# Patient Record
(demographics unavailable — no encounter records)

---

## 2025-04-02 NOTE — CARDIOLOGY SUMMARY
[de-identified] : 4/2/25 atrial fibrillation with a ventricular rate of 69, narrow QRS [de-identified] : TTE 10/19/21  LVEF 57%, LA 4.9cm, trace AI, no AS, mod-severe MR, mild PI, mild-mod TR, RVSP 50-55

## 2025-04-02 NOTE — END OF VISIT
[Time Spent: ___ minutes] : I have spent [unfilled] minutes of time on the encounter which excludes teaching and separately reported services. [FreeTextEntry4] :  I, Pa Houston, am scribing for and in the presence of  in the following sections HISTORY OF PRESENT ILLNESSS, PAST MEDICAL/FAMILY/SOCIAL HISTORY; REVIEW OF SYSTEMS; VITAL SIGNS; PHYSICAL EXAM; ASSESSMENT/PLAN [FreeTextEntry3] : I, Sushant Pierce, personally performed the services described in this documentation. All medical record entries made by the scribe were at my direction and in my presence. I have reviewed the chart and agree that the record reflects my personal performance and is accurate and complete.            I was present for the above evaluation and agree with the history, physical examination, assessment and plan as above.

## 2025-04-02 NOTE — HISTORY OF PRESENT ILLNESS
[FreeTextEntry1] : 95 year old woman with history of diastolic heart failure,  mod pulmonary htn, arthritis, HTN, and atrial fibrillation presenting for evaluation of atrial fibrillation and bradycardia.  She has had persistent/permanent AF and had been on rate control with Toprol 25mg qd, and anticoagulation with Pradaxa 150mg bid. Recently she has been noted to have slow ventricular rates on ECG. Beta blocker was stopped in January, but she continued to have occasional HRs in the 40s noted on ECG.  She feels generally well and specifically denies palpitation, dizziness, lightheadedness SOB, syncope or near syncope.  She does not walk much but gets around with light activity and remained social with family. She reports fatigue after doing a lot of activity, but feels well at rest.  She notes that her activity has been limited by her bad hip. She has decided not to undergo a hip replacement due to her age.  She has had gait instability, and has had falls in the past, but no major trauma or bleeding.  She previously had adverse reactions to Eliquis as well as generic dabigatran, but has been tolerating Pradaxa 150mg bid well. Of note last GFR was 36.   ECG today reveals atrial fibrillation with a ventricular rate of 69, with narrow QRS.    Current meds include Pradaxa 150mg bid, lisinopril 20, amlodipine, ezetimibe, gabapentin, torsemide, ultram

## 2025-04-02 NOTE — PHYSICAL EXAM
[Well Developed] : well developed [Well Nourished] : well nourished [No Acute Distress] : no acute distress [Normal Conjunctiva] : normal conjunctiva [Normal Venous Pressure] : normal venous pressure [No Carotid Bruit] : no carotid bruit [Normal S1, S2] : normal S1, S2 [No Murmur] : no murmur [No Rub] : no rub [No Gallop] : no gallop [Clear Lung Fields] : clear lung fields [Good Air Entry] : good air entry [No Respiratory Distress] : no respiratory distress  [Soft] : abdomen soft [Non Tender] : non-tender [No Masses/organomegaly] : no masses/organomegaly [Normal Bowel Sounds] : normal bowel sounds [Normal Gait] : normal gait [No Edema] : no edema [No Cyanosis] : no cyanosis [No Clubbing] : no clubbing [No Varicosities] : no varicosities [No Rash] : no rash [No Skin Lesions] : no skin lesions [Moves all extremities] : moves all extremities [No Focal Deficits] : no focal deficits [Normal Speech] : normal speech [Alert and Oriented] : alert and oriented [Normal memory] : normal memory [de-identified] : irregularly irregular. 2/6 VELMA, intact S2.

## 2025-04-02 NOTE — CARDIOLOGY SUMMARY
[de-identified] : 4/2/25 atrial fibrillation with a ventricular rate of 69, narrow QRS [de-identified] : TTE 10/19/21  LVEF 57%, LA 4.9cm, trace AI, no AS, mod-severe MR, mild PI, mild-mod TR, RVSP 50-55

## 2025-04-02 NOTE — REVIEW OF SYSTEMS
[Negative] : Heme/Lymph [SOB] : no shortness of breath [Dyspnea on exertion] : not dyspnea during exertion [Lower Ext Edema] : no extremity edema [Leg Claudication] : no intermittent leg claudication [Palpitations] : no palpitations [Syncope] : no syncope [FreeTextEntry9] : hip pain

## 2025-04-02 NOTE — DISCUSSION/SUMMARY
[EKG obtained to assist in diagnosis and management of assessed problem(s)] : EKG obtained to assist in diagnosis and management of assessed problem(s) [FreeTextEntry1] : 95 year old woman with history of diastolic heart failure, mod pulmonary htn, arthritis, HTN, and atrial fibrillation presenting for evaluation of atrial fibrillation and bradycardia.  She has permanent atrial fibrillation with slow ventricular rates, which has been generally asymptomatic. Initially, she was on Toprol for rate control, but it was discontinued earlier this year due to slow ventricular rates.  ECG today reveals a ventricular rate of 69, but short pauses and bradycardia to the 40s bpm has been noted on ECG in the recent past. She has a narrow QRS and no evidence of advanced AV block.  Given bradycardia, there have been consideration regarding a PPM implant. However, the patient is currently feeling generally well, with no clear symptoms related to bradycardia, will hold off on PPM implantation unless symptoms or more profound bradycardia develop.  Further monitoring will be conducted with a wearable event monitor to better assess her HRs and attempt correlation with any symptoms.   She has been on Pradaxa for thromboembolic prophylaxis due to [id number] score and persistent atrial fibrillation. We discussed LAAO as an alternative option, given the her gait instability and history of falls. However, the patient and her daughter prefer to avoid procedures due to the patient's age. Would reconsider if bleeding risks increase. In addition, her GFR was noted to be 36- if it declines further, would lower dose of Pradaxa to 75mg bid.   Recommendations: - continue current medications. Avoid rate slowing medications - 1 week MCOT - consider dose adjustment of Pradaxa in future as above - EP follow up 3 months or prn

## 2025-04-02 NOTE — PHYSICAL EXAM
[Well Developed] : well developed [Well Nourished] : well nourished [No Acute Distress] : no acute distress [Normal Conjunctiva] : normal conjunctiva [Normal Venous Pressure] : normal venous pressure [No Carotid Bruit] : no carotid bruit [Normal S1, S2] : normal S1, S2 [No Murmur] : no murmur [No Rub] : no rub [No Gallop] : no gallop [Clear Lung Fields] : clear lung fields [Good Air Entry] : good air entry [No Respiratory Distress] : no respiratory distress  [Soft] : abdomen soft [Non Tender] : non-tender [No Masses/organomegaly] : no masses/organomegaly [Normal Bowel Sounds] : normal bowel sounds [Normal Gait] : normal gait [No Edema] : no edema [No Cyanosis] : no cyanosis [No Clubbing] : no clubbing [No Varicosities] : no varicosities [No Rash] : no rash [No Skin Lesions] : no skin lesions [Moves all extremities] : moves all extremities [No Focal Deficits] : no focal deficits [Normal Speech] : normal speech [Alert and Oriented] : alert and oriented [Normal memory] : normal memory [de-identified] : irregularly irregular. 2/6 VELMA, intact S2.

## 2025-04-02 NOTE — DISCUSSION/SUMMARY
[EKG obtained to assist in diagnosis and management of assessed problem(s)] : EKG obtained to assist in diagnosis and management of assessed problem(s) [FreeTextEntry1] : 95 year old woman with history of diastolic heart failure, mod pulmonary htn, arthritis, HTN, and atrial fibrillation presenting for evaluation of atrial fibrillation and bradycardia.  She has permanent atrial fibrillation with slow ventricular rates, which has been generally asymptomatic. Initially, she was on Toprol for rate control, but it was discontinued earlier this year due to slow ventricular rates.  ECG today reveals a ventricular rate of 69, but short pauses and bradycardia to the 40s bpm has been noted on ECG in the recent past. She has a narrow QRS and no evidence of advanced AV block.  Given bradycardia, there have been consideration regarding a PPM implant. However, the patient is currently feeling generally well, with no clear symptoms related to bradycardia, will hold off on PPM implantation unless symptoms or more profound bradycardia develop.  Further monitoring will be conducted with a wearable event monitor to better assess her HRs and attempt correlation with any symptoms.   She has been on Pradaxa for thromboembolic prophylaxis due to [id number] score and persistent atrial fibrillation. We discussed LAAO as an alternative option, given the her gait instability and history of falls. However, the patient and her daughter prefer to avoid procedures due to the patient's age. Would reconsider if bleeding risks increase. In addition, her GFR was noted to be 36- if it declines further, would lower dose of Pradaxa to 75mg bid.   Recommendations: - continue current medications. Avoid rate slowing medications - 1 week MCOT - consider dose adjustment of Pradaxa in future as above - EP follow up 3 months or prn

## 2025-06-25 NOTE — END OF VISIT
[FreeTextEntry3] :  I, Dr. Sushant Pierce, personally performed the evaluation and management (E/M) services for this established patient who presents today with an existing medical condition.  That E/M includes conducting the examination, assessing all new and exacerbated conditions, and establishing a new plan of care.  Today, my ACP, Niru Underwood [facility], was here to observe my evaluation and management services for this condition to be followed going forward.  [Time Spent: ___ minutes] : I have spent [unfilled] minutes of time on the encounter which excludes teaching and separately reported services.

## 2025-06-25 NOTE — HISTORY OF PRESENT ILLNESS
[FreeTextEntry1] : 95 year old woman with history of diastolic heart failure,  mod pulmonary htn, arthritis, HTN, and atrial fibrillation presenting for followup of permanent atrial fibrillation and bradycardia.  She has had persistent/permanent AF and had been on rate control with Toprol 25mg qd, and anticoagulation with Pradaxa 150mg bid. She was noted to have slow ventricular rates on ECG, and Beta blocker was stopped in January.  Given advanced age and CKD (GFR 38) her pradaxa dose was lowered to 75 mg bid.   Today she is doing well overall and denies palpitation, dizziness, SOB, syncope or near syncope. Due to hip pain, she does not ambulate much and utilizes a wheelchair. She has been having close follow-up of CHF with blood work for BNP and daily weights with diuretic adjustments which she has been tolerating well and currently does not have any s/s of HUTTON/SOB.  Monitor worn 4/02-4/13/25 revealed persistent AF with an average ventricular rate of 59 (range ), 63 pauses occurred with the longest lasting 3.7 seconds, 3 runs of VT occurred with the longest lasting 17 beats with an average rate of 135 bpm, rare PVCs  ECG today reveals atrial fibrillation with a ventricular rate of 61 bpm (narrow QRS)  She previously had adverse reactions to Eliquis as well as generic dabigatran but has been tolerating Pradaxa. Of note last GFR was 38. She has not had major bleeding or significant trauma, and though she is a fall risk she now uses a wheelchair regularly.   Current meds include Pradaxa 75mg bid, lisinopril 20, amlodipine, ezetimibe, gabapentin, torsemide, ultram

## 2025-06-25 NOTE — REVIEW OF SYSTEMS
[Negative] : Heme/Lymph [Feeling Fatigued] : not feeling fatigued [SOB] : no shortness of breath [Dyspnea on exertion] : not dyspnea during exertion [Lower Ext Edema] : no extremity edema [Leg Claudication] : no intermittent leg claudication [Palpitations] : no palpitations [Syncope] : no syncope [Cough] : no cough [Dizziness] : no dizziness [Weakness] : no weakness [Easy Bleeding] : no tendency for easy bleeding [Easy Bruising] : no tendency for easy bruising [FreeTextEntry5] : See HPI  [FreeTextEntry9] : hip pain

## 2025-06-25 NOTE — CARDIOLOGY SUMMARY
[de-identified] : 6/25/25:  AF 61 bpm.  4/2/25 atrial fibrillation with a ventricular rate of 69, narrow QRS [de-identified] : Monitor worn 4/02-4/13/25 revealed persistent AF with an average ventricular rate of 59 (range ), 63 pauses occurred with the longest lasting 3.7 seconds, 3 runs of VT occurred with the longest lasting 17 beats with an average rate of 135 bpm, rare PVCs [de-identified] : TTE 10/19/21  LVEF 57%, LA 4.9cm, trace AI, no AS, mod-severe MR, mild PI, mild-mod TR, RVSP 50-55

## 2025-06-25 NOTE — DISCUSSION/SUMMARY
[EKG obtained to assist in diagnosis and management of assessed problem(s)] : EKG obtained to assist in diagnosis and management of assessed problem(s) [FreeTextEntry1] : 95 year old woman with history of diastolic heart failure,  mod pulmonary htn, arthritis, HTN, and atrial fibrillation presenting for followup of permanent atrial fibrillation and bradycardia. Ms. Leo is doing well overall and denies palpitation, dizziness, SOB, syncope or near syncope.  She remains in permanent AF, which is generally rate controlled without rate slowing medication. She has occasional slow rates and brief pauses which have been asymptomatic- no current indication for ppm implant.   She has been maintained on OAC with Pradaxa 75mg BID which has since been lowered from 150mg for GFR 36. She has tolerated this well without bleeding events. As this dose is indicated for CrCl<30, I did suggest increasing the dose to 110mg bid.     Recommendations: -Avoid rate slowing medications/Beta blocker -Increase Pradaxa dose from 75 mg BID to 110mg BID (did not tolerate generic formulation).  -consider ppm if evidence of symptomatic bradycardia -F/u with Cardiologist Dr. Koehler for CHF.  -EP f/u annually or sooner PRN.

## 2025-06-25 NOTE — PHYSICAL EXAM
[Well Developed] : well developed [Well Nourished] : well nourished [No Acute Distress] : no acute distress [Normal Conjunctiva] : normal conjunctiva [Normal Venous Pressure] : normal venous pressure [No Carotid Bruit] : no carotid bruit [Normal S1, S2] : normal S1, S2 [No Murmur] : no murmur [No Rub] : no rub [No Gallop] : no gallop [Clear Lung Fields] : clear lung fields [Good Air Entry] : good air entry [No Respiratory Distress] : no respiratory distress  [Soft] : abdomen soft [Non Tender] : non-tender [No Masses/organomegaly] : no masses/organomegaly [Normal Bowel Sounds] : normal bowel sounds [Normal Gait] : normal gait [No Edema] : no edema [No Cyanosis] : no cyanosis [No Clubbing] : no clubbing [No Varicosities] : no varicosities [No Rash] : no rash [No Skin Lesions] : no skin lesions [Moves all extremities] : moves all extremities [No Focal Deficits] : no focal deficits [Normal Speech] : normal speech [Alert and Oriented] : alert and oriented [Normal memory] : normal memory [Edema ___] : edema [unfilled] [de-identified] : irregularly irregular. [de-identified] : Utilizes Wheelchair

## 2025-06-25 NOTE — PHYSICAL EXAM
[Well Developed] : well developed [Well Nourished] : well nourished [No Acute Distress] : no acute distress [Normal Conjunctiva] : normal conjunctiva [Normal Venous Pressure] : normal venous pressure [No Carotid Bruit] : no carotid bruit [Normal S1, S2] : normal S1, S2 [No Murmur] : no murmur [No Rub] : no rub [No Gallop] : no gallop [Clear Lung Fields] : clear lung fields [Good Air Entry] : good air entry [No Respiratory Distress] : no respiratory distress  [Soft] : abdomen soft [Non Tender] : non-tender [No Masses/organomegaly] : no masses/organomegaly [Normal Bowel Sounds] : normal bowel sounds [Normal Gait] : normal gait [No Edema] : no edema [No Cyanosis] : no cyanosis [No Clubbing] : no clubbing [No Varicosities] : no varicosities [No Rash] : no rash [No Skin Lesions] : no skin lesions [Moves all extremities] : moves all extremities [No Focal Deficits] : no focal deficits [Normal Speech] : normal speech [Alert and Oriented] : alert and oriented [Normal memory] : normal memory [Edema ___] : edema [unfilled] [de-identified] : irregularly irregular. [de-identified] : Utilizes Wheelchair

## 2025-06-25 NOTE — END OF VISIT
[FreeTextEntry3] :  I, Dr. Sushant Pierce, personally performed the evaluation and management (E/M) services for this established patient who presents today with an existing medical condition.  That E/M includes conducting the examination, assessing all new and exacerbated conditions, and establishing a new plan of care.  Today, my ACP, Niru Underwood [facility], was here to observe my evaluation and management services for this condition to be followed going forward.  [Time Spent: ___ minutes] : I have spent [unfilled] minutes of time on the encounter which excludes teaching and separately reported services.

## 2025-06-25 NOTE — CARDIOLOGY SUMMARY
[de-identified] : 6/25/25:  AF 61 bpm.  4/2/25 atrial fibrillation with a ventricular rate of 69, narrow QRS [de-identified] : Monitor worn 4/02-4/13/25 revealed persistent AF with an average ventricular rate of 59 (range ), 63 pauses occurred with the longest lasting 3.7 seconds, 3 runs of VT occurred with the longest lasting 17 beats with an average rate of 135 bpm, rare PVCs [de-identified] : TTE 10/19/21  LVEF 57%, LA 4.9cm, trace AI, no AS, mod-severe MR, mild PI, mild-mod TR, RVSP 50-55